# Patient Record
(demographics unavailable — no encounter records)

---

## 2024-10-28 NOTE — PHYSICAL EXAM
[Healthy Appearing] : healthy appearing [Well Nourished] : well nourished [Interactive] : interactive [Normal Appearance] : normal appearance [Well formed] : well formed [Normally Set] : normally set [Normal S1 and S2] : normal S1 and S2 [Murmur] : no murmurs [Clear to Ausculation Bilaterally] : clear to auscultation bilaterally [Abdomen Soft] : soft [Abdomen Tenderness] : non-tender [Normal] : normal  [de-identified] : Trisomy 21 facies

## 2024-10-28 NOTE — HISTORY OF PRESENT ILLNESS
[FreeTextEntry2] : Lena is a 16-year 6-month-old with Trisomy 21, Hashimoto's thyroiditis and vitiligo who returns for follow up.   She was referred in 7/2020. She presented to her pediatrician complaining of alopecia 6 weeks prior. Her pediatrician then sent lab work on 7/7/20 that showed: TSH 18.35 uIU/mL (H), total T4 4.5 ug/dL (L), thyroglobulin Ab 2 IU/mL (H), thyroid peroxidase Ab > 800 IU/mL (H). At initial visit, Dr. Calderón repeated labs that revealed a TSH that was 14.2 uIU/mL and levothyroxine 88 mcg daily was started. Repeat TFTs normal in 9/2020. Lena was seen by Nora Eduardo NP in 1/2022 with labs completed on 2/17/22 TSH was 16 uIU/mL. Levothyroxine increased to 100 mcg daily and repeat TFTs on 4/27/22 were normal. She was last seen in May. Screening labs ordered but not obtained.  In the interim, Lena has been well. She has no headaches, vision issues, constipation, diarrhea, fatigue, sleeping difficulties, heat/cold intolerance. She takes levothyroxine 100 mcg x7 days/week with missed dosages.

## 2024-10-28 NOTE — ASSESSMENT
[FreeTextEntry1] : Lena is a 15 yo female with Hashimotos thyroiditis presenting for follow-up.  Today, Lena is a well-appearing female who is at the % for height, % for weight, and % for BMI. Weight since last visit She remains clinically euthyroid. We discussed that we will obtain TFTs today to ensure she remains biochemically euthyroid or adjust the dosage as necessary.  Plan - Labs today: TSH, Total T4, Free T4

## 2024-12-19 NOTE — DISCUSSION/SUMMARY
[FreeTextEntry1] : Lena is a 16 year 8 month old female with Trisomy 21, Hashimotos thyroiditis and vitiligo who returns for follow up. She is a well appearing adolescent female with regular menses who is at the 28th percentile for height and 77th percentile for weight based on the Down Syndrome adjusted curve. Lean appeared clinically euthyroid. Her recent thyroid studies were normal and this was reviewed with the family. Lena should continue her current dose of levothyroxine. Additional labs reviewed which showed normal CMP, A1c, lipid panel. Her 25(OH)D was slightly low, consistent with vitamin D insufficiency. Recommended that Lena continue to take her multivitamin, and to add vitamin D 1000 units daily. Next follow up with me in 6 months. TFTs to be repeated prior to the visit.

## 2024-12-19 NOTE — CONSULT LETTER
[Dear  ___] : Dear  [unfilled], [Courtesy Letter:] : I had the pleasure of seeing your patient, [unfilled], in my office today. [Please see my note below.] : Please see my note below. [Sincerely,] : Sincerely, [FreeTextEntry3] : Maria Fernanda Calderón DO

## 2024-12-19 NOTE — HISTORY OF PRESENT ILLNESS
[Regular Periods] : regular periods [FreeTextEntry2] : Lena is a 16 year 8 month old female with Trisomy 21, Hashimotos thyroiditis and vitiligo who returns for follow up. She was referred in 7/2020. She presented to her pediatrician complaining of alopecia 6 weeks prior. Her pediatrician then sent lab work on 7/7/20 that showed: TSH 18.35 uIU/mL (H), total T4 4.5 ug/dL (L), thyroglobulin Ab 2 IU/mL (H), thyroid peroxidase Ab > 800 IU/mL (H). At my visit, repeat TSH was 14.2 uIU/mL and levothyroxine 88 mcg daily was started. Repeat TFTs normal in 9/2020. Dose has since changed. Lena was last seen by Nora Eduardo NP in 5/2024.   Lena now returns for follow up.  Prior labs later performed on 11/2/24 that showed: 25(OH)D 26.6 ng/mL (L); normal: TSH, total T4, free T4, CMP, lipid panel, A1c. This appt was then scheduled. She takes levothyroxine 100 mcg daily - only misses an occasional dose.  She had a cough for a few weeks - took azithromycin, completed last Saturday.   Family says that the prior hair issue turned out not to be alopecia.  Mom takes medication for the thyroid.     [FreeTextEntry1] : Menarche 10 yo; LMP mid 11/2024.

## 2025-04-24 NOTE — DISCUSSION/SUMMARY
[FreeTextEntry1] :  Lena is a 17 year old female with Trisomy 21, Hashimotos thyroiditis and vitiligo who returns for follow up. She is a well appearing adolescent female with regular menses who is at the 25th percentile for height and 69th percentile for weight based on the Down Syndrome adjusted curve. Physical exam was unremarkable. Her efforts in participating in physical activity reflects in her improved BMI. We commended her on this and encouraged her to continue.   Lena appeared clinically euthyroid. She had her TFTs done before this visit and will follow up on the labs once they result. Lena should continue her current dose of levothyroxine unless instructed to change based on the new labs. Recommended that Lena also continue to take Vitamin D. Next follow up with Dr. Calderón in 6 months. TFTs to be repeated prior to the visit and a lab slip was provided.   Howard Addison | PGY 4 Pediatric Endocrinology Fellow  ADD:  Labs done on 4/23/2025 TSH: 1.91 uIU/mL Total T4: 9 ug/dL Free T4: 1.6 ng/dL  Her thyroid function is normal and she should continue with the Levothyroxine 100 mcg daily. I sent refills.

## 2025-04-24 NOTE — PHYSICAL EXAM
[Well Nourished] : well nourished [Interactive] : interactive [Normal Appearance] : normal appearance [Well formed] : well formed [Normally Set] : normally set [Abdomen Soft] : soft [Abdomen Tenderness] : non-tender [] : no hepatosplenomegaly [Normal] : normal  [Healthy Appearing] : healthy appearing [Dysmorphic] : dysmorphic  [Acanthosis Nigricans___] : no acanthosis nigricans [Goiter] : no goiter [Enlarged Diffusely] : was not enlarged [de-identified] : vitiligo

## 2025-04-24 NOTE — PHYSICAL EXAM
[Well Nourished] : well nourished [Interactive] : interactive [Normal Appearance] : normal appearance [Well formed] : well formed [Normally Set] : normally set [Abdomen Soft] : soft [Abdomen Tenderness] : non-tender [] : no hepatosplenomegaly [Normal] : normal  [Healthy Appearing] : healthy appearing [Dysmorphic] : dysmorphic  [Acanthosis Nigricans___] : no acanthosis nigricans [Goiter] : no goiter [Enlarged Diffusely] : was not enlarged [de-identified] : vitiligo

## 2025-04-24 NOTE — PHYSICAL EXAM
[Well Nourished] : well nourished [Interactive] : interactive [Normal Appearance] : normal appearance [Well formed] : well formed [Normally Set] : normally set [Abdomen Soft] : soft [Abdomen Tenderness] : non-tender [] : no hepatosplenomegaly [Normal] : normal  [Healthy Appearing] : healthy appearing [Dysmorphic] : dysmorphic  [Acanthosis Nigricans___] : no acanthosis nigricans [Goiter] : no goiter [Enlarged Diffusely] : was not enlarged [de-identified] : vitiligo

## 2025-04-24 NOTE — PHYSICAL EXAM
[Well Nourished] : well nourished [Interactive] : interactive [Normal Appearance] : normal appearance [Well formed] : well formed [Normally Set] : normally set [Abdomen Soft] : soft [Abdomen Tenderness] : non-tender [] : no hepatosplenomegaly [Normal] : normal  [Healthy Appearing] : healthy appearing [Dysmorphic] : dysmorphic  [Acanthosis Nigricans___] : no acanthosis nigricans [Goiter] : no goiter [Enlarged Diffusely] : was not enlarged [de-identified] : vitiligo

## 2025-04-24 NOTE — DISCUSSION/SUMMARY
[FreeTextEntry1] :  Lena is a 17 year old female with Trisomy 21, Hashimotos thyroiditis and vitiligo who returns for follow up. She is a well appearing adolescent female with regular menses who is at the 25th percentile for height and 69th percentile for weight based on the Down Syndrome adjusted curve. Physical exam was unremarkable. Her efforts in participating in physical activity reflects in her improved BMI. We commended her on this and encouraged her to continue.   Lena appeared clinically euthyroid. She had her TFTs done before this visit and will follow up on the labs once they result. Lena should continue her current dose of levothyroxine unless instructed to change based on the new labs. Recommended that Lena also continue to take Vitamin D. Next follow up with Dr. Caldreón in 6 months. TFTs to be repeated prior to the visit and a lab slip was provided.   Howard Addison | PGY 4 Pediatric Endocrinology Fellow  ADD:  Labs done on 4/23/2025 TSH: 1.91 uIU/mL Total T4: 9 ug/dL Free T4: 1.6 ng/dL  Her thyroid function is normal and she should continue with the Levothyroxine 100 mcg daily. I sent refills.

## 2025-04-24 NOTE — HISTORY OF PRESENT ILLNESS
[Regular Periods] : regular periods [Polyuria] : no polyuria [Polydipsia] : no polydipsia [Constipation] : no constipation [Cold Intolerance] : no cold intolerance [Heat Intolerance] : no heat intolerance [Abdominal Pain] : no abdominal pain [FreeTextEntry2] : Lena is a 17 year old female with Trisomy 21, Hashimotos thyroiditis, alopecia and vitiligo who returns for follow up. She was referred in 7/2020. She presented to her pediatrician complaining of alopecia 6 weeks prior. Her pediatrician then sent lab work on 7/7/2020 that showed: TSH 18.35 uIU/mL (H), total T4 4.5 ug/dL (L), thyroglobulin Ab 2 IU/mL (H), thyroid peroxidase Ab > 800 IU/mL (H). At my visit, repeat TSH was 14.2 uIU/mL and levothyroxine 88 mcg daily was started. Repeat TFTs normal in 9/2020. Dose has since changed. Lena was last seen by Dr Calderón in 12/2024 and her TFTs were normal.  11/2/24 labs showed: 25(OH)D 26.6 ng/mL (L); normal: TSH, total T4, free T4, CMP, lipid panel, A1c.   Lena followed up in December 2024. She was taking levothyroxine 100 mcg daily and only missed an occasional dose and was taking vitamin D supplementation.  Since the last visit, she had been well. Parents just had the BW done for the TFTs. Lena is reported to taking Levothyroxine daily in the morning on an empty stomach, and waiting 1 hour before eating. Parents give the whole tablet in a small amount of apple sauce. Overall, she is eating home food, and the portions are controlled and meals are balanced. She is making an effort to be more physically active, taking the stairs and dancing to her favorite songs. She is in the 11th grade, getting ST/OT/PT and in an IEP.    [FreeTextEntry1] : Menarche 10 yo; LMP mid 3/2025

## 2025-04-24 NOTE — REVIEW OF SYSTEMS
[Nl] : Neurological [Cold Intolerance] : cold tolerant [Heat Intolerance] : heat tolerant [Polydypsia] : no polydipsia [Polyuria] : no polyuria

## 2025-04-24 NOTE — PHYSICAL EXAM
[Well Nourished] : well nourished [Interactive] : interactive [Normal Appearance] : normal appearance [Well formed] : well formed [Normally Set] : normally set [Abdomen Soft] : soft [Abdomen Tenderness] : non-tender [] : no hepatosplenomegaly [Normal] : normal  [Healthy Appearing] : healthy appearing [Dysmorphic] : dysmorphic  [Acanthosis Nigricans___] : no acanthosis nigricans [Goiter] : no goiter [Enlarged Diffusely] : was not enlarged [de-identified] : vitiligo